# Patient Record
Sex: MALE | Race: WHITE | NOT HISPANIC OR LATINO
[De-identification: names, ages, dates, MRNs, and addresses within clinical notes are randomized per-mention and may not be internally consistent; named-entity substitution may affect disease eponyms.]

---

## 2020-07-18 ENCOUNTER — TRANSCRIPTION ENCOUNTER (OUTPATIENT)
Age: 60
End: 2020-07-18

## 2020-07-31 ENCOUNTER — TRANSCRIPTION ENCOUNTER (OUTPATIENT)
Age: 60
End: 2020-07-31

## 2022-04-28 PROBLEM — Z00.00 ENCOUNTER FOR PREVENTIVE HEALTH EXAMINATION: Status: ACTIVE | Noted: 2022-04-28

## 2022-06-09 ENCOUNTER — APPOINTMENT (OUTPATIENT)
Dept: ORTHOPEDIC SURGERY | Facility: CLINIC | Age: 62
End: 2022-06-09
Payer: COMMERCIAL

## 2022-06-09 DIAGNOSIS — M75.122 COMPLETE ROTATOR CUFF TEAR OR RUPTURE OF LEFT SHOULDER, NOT SPECIFIED AS TRAUMATIC: ICD-10-CM

## 2022-06-09 DIAGNOSIS — Z78.9 OTHER SPECIFIED HEALTH STATUS: ICD-10-CM

## 2022-06-09 DIAGNOSIS — M25.512 PAIN IN LEFT SHOULDER: ICD-10-CM

## 2022-06-09 PROCEDURE — 99214 OFFICE O/P EST MOD 30 MIN: CPT

## 2022-06-09 NOTE — PHYSICAL EXAM
[Left] : left shoulder [5___] : internal rotation 5[unfilled]/5 [de-identified] : Constitutional: The general appearance of the patient is well developed, well nourished, no deformities and well groomed.Normal \par  \par Gait: Gait and function is as follows:normal gait.\par  \par Skin: Head and neck visualized skin is normal.Left upper extremity visualized skin is normal.Right upper extremity visualized skin is normal.\par  \par Cardiovascular: palpable radial pulse bilaterally.\par  \par Neurologic: The patient is oriented to time, place and person.Sensation to light touch intact in the bilateral upper extremities.Mood and Affect is normal.\par \par  [] : no tenderness at bicipital groove [FreeTextEntry8] : minimal supra TTP [de-identified] : 4/5 supra [TWNoteComboBox7] : active forward flexion 170 degrees [de-identified] : active abduction 90 degrees [TWNoteComboBox6] : internal rotation L1 [de-identified] : external rotation 50 degrees

## 2022-06-09 NOTE — DISCUSSION/SUMMARY
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 There is a moderate risk of morbidity with further treatment, especially from use of prescription strength medications and possible side effects of these medications which include upset stomach and cardiac/renal issues with long term use were discussed. \par I recommended that the patient follow-up with their medical physician to discuss any significant specific potential issues with long term use such as interactions with current medications or with exacerbation of underlying medical morbidities.\par \par \par \par I, Dr. Herman Braney, attest that this documentation was recorded by the scribe, in my presence, and that it accurately reflects the service I personally performed, and the decisions made by me with my edits as appropriate.  \par \par I, Liam Nuñez, acting as scribe, attest that this documentation has been prepared under the direction and in the presence of Provider Herman Barney MD.\par

## 2022-06-09 NOTE — HISTORY OF PRESENT ILLNESS
[de-identified] : Patient states PT has been helpful for the shoulder. He has 50% improvement with PT. Patient denies new injuries or traumas. Patient was taking Mobic and finds it to be helpful and is requesting a refill.\par

## 2022-06-09 NOTE — REASON FOR VISIT
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

## 2022-09-13 ENCOUNTER — RX RENEWAL (OUTPATIENT)
Age: 62
End: 2022-09-13

## 2022-09-13 RX ORDER — MELOXICAM 15 MG/1
15 TABLET ORAL
Qty: 30 | Refills: 0 | Status: ACTIVE | COMMUNITY
Start: 2022-06-09 | End: 1900-01-01

## 2022-10-11 ENCOUNTER — RX RENEWAL (OUTPATIENT)
Age: 62
End: 2022-10-11